# Patient Record
Sex: MALE | Race: WHITE | Employment: FULL TIME | URBAN - METROPOLITAN AREA
[De-identification: names, ages, dates, MRNs, and addresses within clinical notes are randomized per-mention and may not be internally consistent; named-entity substitution may affect disease eponyms.]

---

## 2021-06-17 ENCOUNTER — OFFICE VISIT (OUTPATIENT)
Dept: OCCUPATIONAL MEDICINE | Age: 41
End: 2021-06-17
Attending: FAMILY MEDICINE
Payer: COMMERCIAL

## 2021-06-17 DIAGNOSIS — Z00.00 ROUTINE GENERAL MEDICAL EXAMINATION AT A HEALTH CARE FACILITY: Primary | ICD-10-CM

## 2021-06-21 ENCOUNTER — LAB ENCOUNTER (OUTPATIENT)
Dept: LAB | Age: 41
End: 2021-06-21
Attending: FAMILY MEDICINE
Payer: COMMERCIAL

## 2021-06-21 DIAGNOSIS — Z00.00 ROUTINE GENERAL MEDICAL EXAMINATION AT A HEALTH CARE FACILITY: ICD-10-CM

## 2021-06-24 ENCOUNTER — APPOINTMENT (OUTPATIENT)
Dept: OCCUPATIONAL MEDICINE | Age: 41
End: 2021-06-24
Attending: FAMILY MEDICINE
Payer: COMMERCIAL

## 2021-07-13 ENCOUNTER — PATIENT MESSAGE (OUTPATIENT)
Dept: FAMILY MEDICINE CLINIC | Facility: CLINIC | Age: 41
End: 2021-07-13

## 2021-07-13 ENCOUNTER — OFFICE VISIT (OUTPATIENT)
Dept: FAMILY MEDICINE CLINIC | Facility: CLINIC | Age: 41
End: 2021-07-13
Payer: COMMERCIAL

## 2021-07-13 VITALS
SYSTOLIC BLOOD PRESSURE: 142 MMHG | HEIGHT: 70 IN | WEIGHT: 235 LBS | BODY MASS INDEX: 33.64 KG/M2 | HEART RATE: 69 BPM | DIASTOLIC BLOOD PRESSURE: 98 MMHG

## 2021-07-13 DIAGNOSIS — Z00.00 WELL ADULT EXAM: Primary | ICD-10-CM

## 2021-07-13 DIAGNOSIS — D22.9 MULTIPLE NEVI: ICD-10-CM

## 2021-07-13 DIAGNOSIS — Z72.0 TOBACCO USER: ICD-10-CM

## 2021-07-13 DIAGNOSIS — I10 ESSENTIAL HYPERTENSION: ICD-10-CM

## 2021-07-13 DIAGNOSIS — F41.9 ANXIETY: ICD-10-CM

## 2021-07-13 DIAGNOSIS — D17.9 MULTIPLE LIPOMAS: ICD-10-CM

## 2021-07-13 DIAGNOSIS — R22.1 NECK MASS: ICD-10-CM

## 2021-07-13 DIAGNOSIS — E78.5 HYPERLIPIDEMIA, UNSPECIFIED HYPERLIPIDEMIA TYPE: ICD-10-CM

## 2021-07-13 PROCEDURE — 99386 PREV VISIT NEW AGE 40-64: CPT | Performed by: NURSE PRACTITIONER

## 2021-07-13 PROCEDURE — 99203 OFFICE O/P NEW LOW 30 MIN: CPT | Performed by: NURSE PRACTITIONER

## 2021-07-13 RX ORDER — ROSUVASTATIN CALCIUM 5 MG/1
5 TABLET, COATED ORAL NIGHTLY
Qty: 14 TABLET | Refills: 0 | Status: SHIPPED | OUTPATIENT
Start: 2021-07-13 | End: 2021-07-29

## 2021-07-13 RX ORDER — EZETIMIBE 10 MG/1
10 TABLET ORAL NIGHTLY
COMMUNITY
End: 2021-07-13

## 2021-07-13 RX ORDER — FLUOXETINE 10 MG/1
10 TABLET, FILM COATED ORAL DAILY
Qty: 90 TABLET | Refills: 1 | Status: SHIPPED | OUTPATIENT
Start: 2021-07-13 | End: 2021-10-19

## 2021-07-13 RX ORDER — CLONAZEPAM 0.5 MG/1
0.5 TABLET ORAL 2 TIMES DAILY PRN
Qty: 30 TABLET | Refills: 0 | Status: SHIPPED | OUTPATIENT
Start: 2021-07-13

## 2021-07-13 RX ORDER — TELMISARTAN AND HYDROCHLORTHIAZIDE 80; 25 MG/1; MG/1
1 TABLET ORAL DAILY
Qty: 30 TABLET | Refills: 1 | Status: SHIPPED | OUTPATIENT
Start: 2021-07-13 | End: 2021-07-29

## 2021-07-13 RX ORDER — FLUOXETINE 20 MG/1
20 TABLET, FILM COATED ORAL DAILY
Qty: 90 TABLET | Refills: 1 | Status: SHIPPED | OUTPATIENT
Start: 2021-07-13 | End: 2021-10-19

## 2021-07-13 RX ORDER — EZETIMIBE 10 MG/1
10 TABLET ORAL NIGHTLY
Qty: 90 TABLET | Refills: 1 | Status: SHIPPED | OUTPATIENT
Start: 2021-07-13 | End: 2021-07-29

## 2021-07-13 RX ORDER — VALSARTAN AND HYDROCHLOROTHIAZIDE 80; 12.5 MG/1; MG/1
1 TABLET, FILM COATED ORAL DAILY
COMMUNITY
End: 2021-07-13 | Stop reason: DRUGHIGH

## 2021-07-13 RX ORDER — PAROXETINE 30 MG/1
30 TABLET, FILM COATED ORAL EVERY MORNING
Qty: 90 TABLET | Refills: 1 | Status: SHIPPED | OUTPATIENT
Start: 2021-07-13 | End: 2021-07-13 | Stop reason: CLARIF

## 2021-07-13 NOTE — PROGRESS NOTES
HPI  Pt here to establish care. Lived in [facility] for 8 years. Worked as a teacher. Has just recently returned [facility] and will need to [facility] care for refills.      klonipin-restless leg and sleep issues-0.25 mg-0.5 mg    Xanax for high anxiety 0.25 Vaccine: Birth to 56yrs Aged Out    Past Medical History:   Diagnosis Date   • Essential hypertension        .   Past Surgical History:   Procedure Laterality Date   • Other surgical history Left     Ankle francture        Family History   Problem Relation Tab Take 1 tablet (0.5 mg total) by mouth 2 (two) times daily as needed. 30 tablet 0   • ezetimibe 10 MG Oral Tab Take 1 tablet (10 mg total) by mouth nightly. 90 tablet 1   • Rosuvastatin Calcium 5 MG Oral Tab Take 1 tablet (5 mg total) by mouth nightly. jaw.; scattered nevi on arms, neck and face   Neurological:      Mental Status: He is alert and oriented to person, place, and time.       Coordination: Coordination normal.      Gait: Gait normal.   Psychiatric:         Behavior: Behavior normal.         T LIPID PANEL    TSH W REFLEX TO FREE T4    VITAMIN B12    VITAMIN D, 25-HYDROXY      Other Visit Diagnoses     Anxiety        Relevant Medications    clonazePAM 0.5 MG Oral Tab                  Discussed plan of care with pt and pt is in agreement. All qu

## 2021-07-13 NOTE — TELEPHONE ENCOUNTER
From: Melva Field  To: GARY Cain Do  Sent: 7/13/2021 5:06 PM CDT  Subject: Prescription Question    Godfrey Castellanos Reveal,     I went and picked up my prescriptions at [facility] and they didn't give me the prozac. I don't know if it was sent or not.  I for

## 2021-07-14 ENCOUNTER — TELEPHONE (OUTPATIENT)
Dept: FAMILY MEDICINE CLINIC | Facility: CLINIC | Age: 41
End: 2021-07-14

## 2021-07-14 NOTE — TELEPHONE ENCOUNTER
Current Outpatient Medications:   •  FLUoxetine HCl 20 MG Oral Tab, Take 1 tablet (20 mg total) by mouth daily. , Disp: 90 tablet, Rfl: 1    •  FLUoxetine HCl 10 MG Oral Tab, Take 1 tablet (10 mg total) by mouth daily. , Disp: 90 tablet, Rfl: 1    Message:

## 2021-07-14 NOTE — TELEPHONE ENCOUNTER
From: GARY Crooks  To: Sarah Gutierrez  Sent: 7/13/2021 7:26 PM CDT  Subject: meds    Leonard-I am so sorry that I missed sending in the fluoxetine.  In the [address] we do not have the 30 mg dose so I sent in a 20 mg and 10 mg tablet for you to take yokasta

## 2021-07-14 NOTE — TELEPHONE ENCOUNTER
Spoke to Pharmacy to inform medications are correct. Insurance only pays for a 30 day supply, approved to dispense medication for a 30 day supply at a time. See [employer] message 07/13/2021.

## 2021-07-15 NOTE — ASSESSMENT & PLAN NOTE
Encourage pt to quit smoking  Change to telmisartan hydrochlorothiazide 80/25 mg I po q day  Check bp daily and record  Send in results in 2 weeks

## 2021-07-16 LAB
ALBUMIN/GLOBULIN RATIO: 2.4 (CALC) (ref 1–2.5)
ALBUMIN: 4.8 G/DL (ref 3.6–5.1)
ALKALINE PHOSPHATASE: 61 U/L (ref 36–130)
ALT: 27 U/L (ref 9–46)
AST: 19 U/L (ref 10–40)
BILIRUBIN, TOTAL: 0.5 MG/DL (ref 0.2–1.2)
BUN: 16 MG/DL (ref 7–25)
CALCIUM: 9.8 MG/DL (ref 8.6–10.3)
CARBON DIOXIDE: 26 MMOL/L (ref 20–32)
CHLORIDE: 102 MMOL/L (ref 98–110)
CHOL/HDLC RATIO: 3.4 (CALC)
CHOLESTEROL, TOTAL: 155 MG/DL
CREATININE: 0.81 MG/DL (ref 0.6–1.35)
EGFR IF AFRICN AM: 128 ML/MIN/1.73M2
EGFR IF NONAFRICN AM: 110 ML/MIN/1.73M2
GLOBULIN: 2 G/DL (CALC) (ref 1.9–3.7)
GLUCOSE: 74 MG/DL (ref 65–99)
HDL CHOLESTEROL: 45 MG/DL
HEMATOCRIT: 40.7 % (ref 38.5–50)
HEMOGLOBIN A1C: 5.5 % OF TOTAL HGB
HEMOGLOBIN: 13.8 G/DL (ref 13.2–17.1)
LDL-CHOLESTEROL: 93 MG/DL (CALC)
MCH: 30.1 PG (ref 27–33)
MCHC: 33.9 G/DL (ref 32–36)
MCV: 88.9 FL (ref 80–100)
MPV: 10.5 FL (ref 7.5–12.5)
NON-HDL CHOLESTEROL: 110 MG/DL (CALC)
PLATELET COUNT: 290 THOUSAND/UL (ref 140–400)
POTASSIUM: 3.9 MMOL/L (ref 3.5–5.3)
PROTEIN, TOTAL: 6.8 G/DL (ref 6.1–8.1)
RDW: 13.2 % (ref 11–15)
RED BLOOD CELL COUNT: 4.58 MILLION/UL (ref 4.2–5.8)
SODIUM: 137 MMOL/L (ref 135–146)
TRIGLYCERIDES: 78 MG/DL
TSH W/REFLEX TO FT4: 2.37 MIU/L (ref 0.4–4.5)
VITAMIN B12: 443 PG/ML (ref 200–1100)
VITAMIN D, 25-OH, TOTAL: 33 NG/ML (ref 30–100)
WHITE BLOOD CELL COUNT: 9.6 THOUSAND/UL (ref 3.8–10.8)

## 2021-07-29 DIAGNOSIS — I10 ESSENTIAL HYPERTENSION: ICD-10-CM

## 2021-07-29 DIAGNOSIS — E78.5 HYPERLIPIDEMIA, UNSPECIFIED HYPERLIPIDEMIA TYPE: ICD-10-CM

## 2021-07-30 RX ORDER — EZETIMIBE 10 MG/1
10 TABLET ORAL NIGHTLY
Qty: 90 TABLET | Refills: 1 | Status: SHIPPED | OUTPATIENT
Start: 2021-07-30 | End: 2021-10-28

## 2021-07-30 RX ORDER — ROSUVASTATIN CALCIUM 5 MG/1
5 TABLET, COATED ORAL NIGHTLY
Qty: 90 TABLET | Refills: 1 | Status: SHIPPED | OUTPATIENT
Start: 2021-07-30 | End: 2022-06-20

## 2021-07-30 RX ORDER — TELMISARTAN AND HYDROCHLORTHIAZIDE 80; 25 MG/1; MG/1
1 TABLET ORAL DAILY
Qty: 90 TABLET | Refills: 1 | Status: SHIPPED | OUTPATIENT
Start: 2021-07-30 | End: 2021-10-19

## 2021-08-23 ENCOUNTER — OFFICE VISIT (OUTPATIENT)
Dept: FAMILY MEDICINE CLINIC | Facility: CLINIC | Age: 41
End: 2021-08-23
Payer: COMMERCIAL

## 2021-08-23 VITALS
HEART RATE: 74 BPM | SYSTOLIC BLOOD PRESSURE: 132 MMHG | HEIGHT: 70 IN | BODY MASS INDEX: 33.07 KG/M2 | DIASTOLIC BLOOD PRESSURE: 90 MMHG | WEIGHT: 231 LBS

## 2021-08-23 DIAGNOSIS — J01.00 ACUTE NON-RECURRENT MAXILLARY SINUSITIS: Primary | ICD-10-CM

## 2021-08-23 PROCEDURE — 99213 OFFICE O/P EST LOW 20 MIN: CPT | Performed by: NURSE PRACTITIONER

## 2021-08-23 PROCEDURE — 3080F DIAST BP >= 90 MM HG: CPT | Performed by: NURSE PRACTITIONER

## 2021-08-23 PROCEDURE — 3075F SYST BP GE 130 - 139MM HG: CPT | Performed by: NURSE PRACTITIONER

## 2021-08-23 PROCEDURE — 3008F BODY MASS INDEX DOCD: CPT | Performed by: NURSE PRACTITIONER

## 2021-08-23 RX ORDER — ALBUTEROL SULFATE 90 UG/1
2 AEROSOL, METERED RESPIRATORY (INHALATION) EVERY 4 HOURS PRN
Qty: 18 G | Refills: 5 | Status: SHIPPED | OUTPATIENT
Start: 2021-08-23

## 2021-08-23 RX ORDER — LEVOFLOXACIN 500 MG/1
500 TABLET, FILM COATED ORAL DAILY
Qty: 10 TABLET | Refills: 0 | Status: SHIPPED | OUTPATIENT
Start: 2021-08-23 | End: 2021-09-02

## 2021-08-23 NOTE — PROGRESS NOTES
HPI  Pt here for uri for the past 3 weeks. Covid tested a couple of time and all were negative. Took robitussin and muccinex. Took also sudafed and ibuprofen. Symptoms not really improved. Feels very congested, has occasional cough.  Denies fever, sor Other Topics      Concerns:        Not on file    Social History Narrative      Not on file    Social Determinants of Health  Financial Resource Strain:       Difficulty of Paying Living Expenses:   Food Insecurity:       Worried About Running Out of Food • FLUoxetine HCl 10 MG Oral Tab Take 1 tablet (10 mg total) by mouth daily. 90 tablet 1       Allergies:    Prednisone              ANXIETY    Physical Exam  Vitals and nursing note reviewed. Constitutional:       General: He is not in acute distress. MCG/ACT Inhalation Aero Soln    Spacer/Aero-Holding Chambers Does not apply Device                  Discussed plan of care with pt and pt is in agreement. All questions answered. Pt to call with questions or concerns.       Encouraged to sign up for My Chart

## 2021-08-27 ENCOUNTER — HOSPITAL ENCOUNTER (OUTPATIENT)
Dept: ULTRASOUND IMAGING | Age: 41
Discharge: HOME | End: 2021-08-27
Attending: NURSE PRACTITIONER
Payer: COMMERCIAL

## 2021-08-27 DIAGNOSIS — R22.1 NECK MASS: ICD-10-CM

## 2021-08-27 PROCEDURE — 76536 US EXAM OF HEAD AND NECK: CPT | Performed by: NURSE PRACTITIONER

## 2021-09-30 ENCOUNTER — TELEPHONE (OUTPATIENT)
Dept: FAMILY MEDICINE CLINIC | Facility: CLINIC | Age: 41
End: 2021-09-30

## 2021-09-30 DIAGNOSIS — J01.00 ACUTE NON-RECURRENT MAXILLARY SINUSITIS: ICD-10-CM

## 2021-09-30 RX ORDER — LEVOFLOXACIN 500 MG/1
TABLET, FILM COATED ORAL
Qty: 10 TABLET | Refills: 0 | OUTPATIENT
Start: 2021-09-30

## 2021-09-30 NOTE — TELEPHONE ENCOUNTER
Spoke to patient, he received a call from [facility] regarding a refill on the Levaquin. He has no idea why they would call him. He does not need a refill on it.  He informed that his insurance advised him he will need to use [facility] or a mail order so he i

## 2021-10-19 ENCOUNTER — PATIENT MESSAGE (OUTPATIENT)
Dept: FAMILY MEDICINE CLINIC | Facility: CLINIC | Age: 41
End: 2021-10-19

## 2021-10-19 ENCOUNTER — MED REC SCAN ONLY (OUTPATIENT)
Dept: FAMILY MEDICINE CLINIC | Facility: CLINIC | Age: 41
End: 2021-10-19

## 2021-10-19 DIAGNOSIS — I10 ESSENTIAL HYPERTENSION: ICD-10-CM

## 2021-10-19 RX ORDER — FLUOXETINE 20 MG/1
20 TABLET, FILM COATED ORAL DAILY
Qty: 90 TABLET | Refills: 0 | Status: SHIPPED | OUTPATIENT
Start: 2021-10-19 | End: 2021-12-30

## 2021-10-19 RX ORDER — FLUOXETINE 10 MG/1
10 TABLET, FILM COATED ORAL DAILY
Qty: 90 TABLET | Refills: 0 | Status: SHIPPED | OUTPATIENT
Start: 2021-10-19 | End: 2021-12-30

## 2021-10-19 RX ORDER — TELMISARTAN AND HYDROCHLORTHIAZIDE 80; 25 MG/1; MG/1
1 TABLET ORAL DAILY
Qty: 90 TABLET | Refills: 0 | Status: SHIPPED | OUTPATIENT
Start: 2021-10-19 | End: 2021-12-20

## 2021-10-19 NOTE — TELEPHONE ENCOUNTER
From: Melva Field  To: GARY Cain Do  Sent: 10/19/2021 9:59 AM CDT  Subject: [facility] 90 day supply     Godfrey Castellanos Reveal,   My insurance says it’s been trying to contact you so I can get 90 day supplies of my Fluoxetine and Telmisartan.  If you can he

## 2021-10-21 ENCOUNTER — TELEPHONE (OUTPATIENT)
Dept: FAMILY MEDICINE CLINIC | Facility: CLINIC | Age: 41
End: 2021-10-21

## 2021-10-21 NOTE — TELEPHONE ENCOUNTER
Pharmacy comments:     The pt was prescribed FLUoxetine TAB 10MG and FLUoxetine TAB 20MG, please verify which dose the pt should be on

## 2021-10-21 NOTE — TELEPHONE ENCOUNTER
Patient is on Fluoxetine 30 mg. Medication does not come in this dose, Kay Canales prescribed 2 0mg and 10 mg to take together. Called [facility], spoke with Noel Carney and notified.      Encounter Date:  7/13/2021                 Signed        From: Sherri Siemens,

## 2021-12-20 DIAGNOSIS — I10 ESSENTIAL HYPERTENSION: ICD-10-CM

## 2021-12-20 RX ORDER — TELMISARTAN AND HYDROCHLORTHIAZIDE 80; 25 MG/1; MG/1
1 TABLET ORAL DAILY
Qty: 90 TABLET | Refills: 1 | Status: SHIPPED | OUTPATIENT
Start: 2021-12-20 | End: 2022-05-16

## 2021-12-20 NOTE — TELEPHONE ENCOUNTER
Refill passed per [facility] protocol. Requested Prescriptions   Pending Prescriptions Disp Refills    TELMISARTAN-HCTZ 80-25 MG Oral Tab [Pharmacy Med Name: Murleen Malone  TAB  80 25] 90 tablet 3     Sig: Take 1 tablet by mouth daily.         Hypertensive Medications Protocol Passed - 12/20/2021  4:57 AM        Passed - CMP or BMP in past 12 months        Passed - Appointment in past 6 or next 3 months        Passed - GFR Non- > 50     Lab Results   Component Value Date    GFRNAA 110 07/15/2021                        Recent Outpatient Visits              3 months ago Acute non-recurrent maxillary sinusitis    [facility] APRN    Office Visit    5 months ago Well adult exam    [place] [facility] [place], [address], [address] APRN    Office Visit    6 months ago Routine general medical examination at a health care facility    [employer] in [address] [place] [address]

## 2021-12-21 PROBLEM — F33.1 MODERATE EPISODE OF RECURRENT MAJOR DEPRESSIVE DISORDER (HCC): Status: ACTIVE | Noted: 2021-12-21

## 2021-12-21 PROBLEM — F10.10 ALCOHOL USE DISORDER, MILD, ABUSE: Status: ACTIVE | Noted: 2021-12-21

## 2021-12-21 PROBLEM — J01.00 ACUTE NON-RECURRENT MAXILLARY SINUSITIS: Status: RESOLVED | Noted: 2021-08-23 | Resolved: 2021-12-21

## 2021-12-21 NOTE — ASSESSMENT & PLAN NOTE
Is looking to join [facility] for support  Will be addressing depression w therapist and psychiatrist  Does not think he needs any medications at this point to continue sobriety

## 2021-12-21 NOTE — ASSESSMENT & PLAN NOTE
Will need extension on fmla  Continue prozac  Will be seeing therapist and psychiatrist after the holidays  Please call if symptoms worsen or are not resolving.

## 2021-12-21 NOTE — PROGRESS NOTES
HPI  Video Call  Pt presents for follow up-pt admitted self to detox for alcohol abuse. Switched prozac to Trintellix-weaning was too quick and pt had bad reaction. Is back on therapist.   Was recommended to do out pt PHP program by care manager.    Needs 2 tobacco: Current User    Substance and Sexual Activity      Alcohol use: Not Currently        Comment: occ      Drug use: Yes        Types: Cannabis      Sexual activity: Not on file    Other Topics      Concerns:        Not on file    Social History [facility]:         Mood and Affect: Mood is depressed.          Assessment and Plan:   Problem List Items Addressed This Visit        Mental Health    Alcohol use disorder, mild, abuse - Primary     Is looking to join [facility] for support  Will be

## 2021-12-30 RX ORDER — FLUOXETINE 20 MG/1
TABLET, FILM COATED ORAL
Qty: 90 TABLET | Refills: 3 | Status: SHIPPED | OUTPATIENT
Start: 2021-12-30

## 2021-12-30 RX ORDER — FLUOXETINE 10 MG/1
TABLET, FILM COATED ORAL
Qty: 90 TABLET | Refills: 3 | Status: SHIPPED | OUTPATIENT
Start: 2021-12-30

## 2021-12-30 NOTE — TELEPHONE ENCOUNTER
From: Anthony Yepez  To: GARY Pedro  Sent: 12/30/2021 7:52 AM CST  Subject: Paperwork for Disability Claim    Hi Lisa Acosta,     I hope you had a nice [place].  I’m writing because I got a call from [address] [place] [address] (my disability provider) and they said t

## 2021-12-31 ENCOUNTER — PATIENT MESSAGE (OUTPATIENT)
Dept: FAMILY MEDICINE CLINIC | Facility: CLINIC | Age: 41
End: 2021-12-31

## 2021-12-31 ENCOUNTER — MED REC SCAN ONLY (OUTPATIENT)
Dept: ADMINISTRATIVE | Age: 41
End: 2021-12-31

## 2021-12-31 ENCOUNTER — TELEPHONE (OUTPATIENT)
Dept: ADMINISTRATIVE | Age: 41
End: 2021-12-31

## 2021-12-31 NOTE — TELEPHONE ENCOUNTER
FMLA/Disability forms received in forms department, logged for processing, sent pt a CDEL message for missing HIPAA.

## 2021-12-31 NOTE — TELEPHONE ENCOUNTER
From: Haroon Stinson  To: GARY Amaro  Sent: 12/31/2021 10:45 AM CST  Subject: Doctors Note    Jeni Ramsey,     I was wondering if I could get a doctors note to give to my employer about our conversation last week concerning my short term disabi

## 2021-12-31 NOTE — TELEPHONE ENCOUNTER
Patient called to see if we rec'd disability forms from [facility]. We have not, gave forms fax number and, explained process/delay. He needs disab starting 12/21  (saw [employer]) - few weeks.  He was off previously and I explained [employer] can only cover 12/21 and forw

## 2022-01-12 NOTE — TELEPHONE ENCOUNTER
From: David Garcia  To: GARY Be  Sent: 12/31/2021 10:45 AM CST  Subject: Doctors Note    Rebecca Ho,     I was wondering if I could get a doctors note to give to my employer about our conversation last week concerning my short term disabi

## 2022-01-12 NOTE — TELEPHONE ENCOUNTER
Returned pt's call from 1/11/2022, pt checking on status of forms, pt states [facility] is unable to provide another extension, will try to expedite forms for pt.  First day off of work was 12/21/2021, per previous letter RTW 3 weeks but pt will contact [employer]

## 2022-01-14 NOTE — TELEPHONE ENCOUNTER
Dear Reyes Myers,    *2 forms requiring signature please*      Please sign off on form: FMLA/Disability  -Highlight the patient and hit \"Chart\" button.   -In Chart Review, w/in the Encounter tab - click 1 time on the Telephone call encounter for 12/31

## 2022-01-14 NOTE — TELEPHONE ENCOUNTER
Federico Coffman    I am working on your patient's form today. I apologize for the delay. We are currently extremely back up in the forms department. I can have his return to work date be 1/24/2022 since you approve.     Thank you,    Champ Ramos

## 2022-01-17 NOTE — TELEPHONE ENCOUNTER
Completed FMLA and disability forms faxed to [facility] at [phone], confirmation received. Sent pt a [employer] message.

## 2022-05-09 ENCOUNTER — OFFICE VISIT (OUTPATIENT)
Dept: FAMILY MEDICINE CLINIC | Facility: CLINIC | Age: 42
End: 2022-05-09
Payer: COMMERCIAL

## 2022-05-09 VITALS
BODY MASS INDEX: 34.5 KG/M2 | DIASTOLIC BLOOD PRESSURE: 92 MMHG | HEIGHT: 70 IN | HEART RATE: 92 BPM | SYSTOLIC BLOOD PRESSURE: 134 MMHG | WEIGHT: 241 LBS
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
## 2022-05-09 DIAGNOSIS — E78.5 HYPERLIPIDEMIA, UNSPECIFIED HYPERLIPIDEMIA TYPE: ICD-10-CM

## 2022-05-09 DIAGNOSIS — S00.86XA BUG BITE OF FACE WITH INFECTION, INITIAL ENCOUNTER: ICD-10-CM

## 2022-05-09 DIAGNOSIS — L08.9 BUG BITE OF FACE WITH INFECTION, INITIAL ENCOUNTER: ICD-10-CM

## 2022-05-09 DIAGNOSIS — I10 ESSENTIAL HYPERTENSION: Primary | ICD-10-CM

## 2022-05-09 DIAGNOSIS — F41.9 ANXIETY: ICD-10-CM

## 2022-05-09 PROCEDURE — 3075F SYST BP GE 130 - 139MM HG: CPT | Performed by: NURSE PRACTITIONER

## 2022-05-09 PROCEDURE — 3080F DIAST BP >= 90 MM HG: CPT | Performed by: NURSE PRACTITIONER

## 2022-05-09 PROCEDURE — 3008F BODY MASS INDEX DOCD: CPT | Performed by: NURSE PRACTITIONER

## 2022-05-09 PROCEDURE — 99214 OFFICE O/P EST MOD 30 MIN: CPT | Performed by: NURSE PRACTITIONER

## 2022-05-09 RX ORDER — CLONAZEPAM 0.5 MG/1
0.5 TABLET ORAL 2 TIMES DAILY PRN
Qty: 30 TABLET | Refills: 0 | Status: SHIPPED | OUTPATIENT
Start: 2022-05-09

## 2022-05-09 RX ORDER — SULFAMETHOXAZOLE AND TRIMETHOPRIM 800; 160 MG/1; MG/1
1 TABLET ORAL 2 TIMES DAILY
Qty: 14 TABLET | Refills: 0 | Status: SHIPPED | OUTPATIENT
Start: 2022-05-09 | End: 2022-05-16

## 2022-05-16 RX ORDER — TELMISARTAN AND HYDROCHLORTHIAZIDE 80; 25 MG/1; MG/1
1 TABLET ORAL DAILY
Qty: 90 TABLET | Refills: 1 | Status: SHIPPED | OUTPATIENT
Start: 2022-05-16

## 2022-05-16 NOTE — TELEPHONE ENCOUNTER
Refill passed per [facility] protocol. Requested Prescriptions   Pending Prescriptions Disp Refills    TELMISARTAN-HCTZ 80-25 MG Oral Tab [Pharmacy Med Name: Fe Bates  TAB  80 25] 90 tablet 3     Sig: Take 1 tablet by mouth daily.         Hypertensive Medications Protocol Passed - 5/16/2022  4:43 AM        Passed - CMP or BMP in past 12 months        Passed - Appointment in past 6 or next 3 months        Passed - GFR Non- > 50     Lab Results   Component Value Date    GFRNAA 110 07/15/2021                     Recent Outpatient Visits              1 week ago Essential hypertension    [facility], [address], [address]N    Office Visit    3 months ago Adjustment disorder with mixed anxiety and depressed mood    [address] [place] [address]    Telemedicine    3 months ago Adjustment disorder with mixed anxiety and depressed mood    [facility]    Telemedicine    4 months ago Adjustment disorder with mixed anxiety and depressed mood    [facility]    Telemedicine    4 months ago Alcohol use disorder, mild, abuse    [address], APRN    Telemedicine

## 2022-06-01 ENCOUNTER — OFFICE VISIT (OUTPATIENT)
Dept: FAMILY MEDICINE CLINIC | Facility: CLINIC | Age: 42
End: 2022-06-01
Payer: COMMERCIAL

## 2022-06-01 VITALS
WEIGHT: 239 LBS | SYSTOLIC BLOOD PRESSURE: 164 MMHG | DIASTOLIC BLOOD PRESSURE: 107 MMHG | HEART RATE: 114 BPM | BODY MASS INDEX: 34.22 KG/M2 | HEIGHT: 70 IN

## 2022-06-01 DIAGNOSIS — I10 ESSENTIAL HYPERTENSION: Primary | ICD-10-CM

## 2022-06-01 PROCEDURE — 3077F SYST BP >= 140 MM HG: CPT | Performed by: NURSE PRACTITIONER

## 2022-06-01 PROCEDURE — 99214 OFFICE O/P EST MOD 30 MIN: CPT | Performed by: NURSE PRACTITIONER

## 2022-06-01 PROCEDURE — 3080F DIAST BP >= 90 MM HG: CPT | Performed by: NURSE PRACTITIONER

## 2022-06-01 PROCEDURE — 3008F BODY MASS INDEX DOCD: CPT | Performed by: NURSE PRACTITIONER

## 2022-06-01 RX ORDER — AMLODIPINE BESYLATE 5 MG/1
5 TABLET ORAL DAILY
Qty: 90 TABLET | Refills: 0 | Status: SHIPPED | OUTPATIENT
Start: 2022-06-01 | End: 2023-05-27

## 2022-06-02 NOTE — ASSESSMENT & PLAN NOTE
Continue telmisartan hydrochlorothiazide  Add amlodipine 5 mg daily  Please aim to eat a diet high in fresh fruits and vegetables, lean protein sources, complex carbohydrates and limited processed and fast foods. Try to get at least 150 minutes of exercise per week-a combination of weight resistance and cardio is preferred.

## 2022-06-20 DIAGNOSIS — I10 ESSENTIAL HYPERTENSION: ICD-10-CM

## 2022-06-20 DIAGNOSIS — F41.9 ANXIETY: ICD-10-CM

## 2022-06-20 DIAGNOSIS — E78.5 HYPERLIPIDEMIA, UNSPECIFIED HYPERLIPIDEMIA TYPE: ICD-10-CM

## 2022-06-20 DIAGNOSIS — J01.00 ACUTE NON-RECURRENT MAXILLARY SINUSITIS: ICD-10-CM

## 2022-06-20 RX ORDER — TELMISARTAN AND HYDROCHLORTHIAZIDE 80; 25 MG/1; MG/1
1 TABLET ORAL DAILY
Qty: 90 TABLET | Refills: 3 | Status: SHIPPED | OUTPATIENT
Start: 2022-06-20

## 2022-06-20 RX ORDER — AMLODIPINE BESYLATE 5 MG/1
5 TABLET ORAL DAILY
Qty: 90 TABLET | Refills: 1 | Status: SHIPPED | OUTPATIENT
Start: 2022-06-20 | End: 2023-06-15

## 2022-06-20 RX ORDER — FLUOXETINE 10 MG/1
10 TABLET, FILM COATED ORAL DAILY
Qty: 90 TABLET | Refills: 3 | Status: SHIPPED | OUTPATIENT
Start: 2022-06-20

## 2022-06-20 RX ORDER — FLUOXETINE 20 MG/1
20 TABLET, FILM COATED ORAL DAILY
Qty: 90 TABLET | Refills: 3 | Status: SHIPPED | OUTPATIENT
Start: 2022-06-20

## 2022-06-20 RX ORDER — CLONAZEPAM 0.5 MG/1
0.5 TABLET ORAL 2 TIMES DAILY PRN
Qty: 90 TABLET | Refills: 0 | Status: SHIPPED | OUTPATIENT
Start: 2022-06-20

## 2022-06-20 RX ORDER — ROSUVASTATIN CALCIUM 5 MG/1
5 TABLET, COATED ORAL NIGHTLY
Qty: 90 TABLET | Refills: 3 | Status: SHIPPED | OUTPATIENT
Start: 2022-06-20

## 2022-06-20 RX ORDER — ALBUTEROL SULFATE 90 UG/1
2 AEROSOL, METERED RESPIRATORY (INHALATION) EVERY 4 HOURS PRN
Qty: 3 EACH | Refills: 3 | Status: SHIPPED | OUTPATIENT
Start: 2022-06-20

## 2022-06-22 ENCOUNTER — OFFICE VISIT (OUTPATIENT)
Dept: FAMILY MEDICINE CLINIC | Facility: CLINIC | Age: 42
End: 2022-06-22
Payer: COMMERCIAL

## 2022-06-22 VITALS
WEIGHT: 238 LBS | SYSTOLIC BLOOD PRESSURE: 122 MMHG | DIASTOLIC BLOOD PRESSURE: 84 MMHG | HEIGHT: 70 IN | HEART RATE: 93 BPM | BODY MASS INDEX: 34.07 KG/M2

## 2022-06-22 DIAGNOSIS — I10 ESSENTIAL HYPERTENSION: Primary | ICD-10-CM

## 2022-06-22 DIAGNOSIS — E66.9 OBESITY (BMI 30.0-34.9): ICD-10-CM

## 2022-06-22 DIAGNOSIS — Z72.0 TOBACCO USER: ICD-10-CM
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
## 2022-06-22 PROBLEM — S00.86XA BUG BITE OF FACE WITH INFECTION, INITIAL ENCOUNTER: Status: RESOLVED | Noted: 2022-05-09 | Resolved: 2022-06-22

## 2022-06-22 PROBLEM — L08.9 BUG BITE OF FACE WITH INFECTION, INITIAL ENCOUNTER: Status: RESOLVED | Noted: 2022-05-09 | Resolved: 2022-06-22

## 2022-06-22 PROCEDURE — 3008F BODY MASS INDEX DOCD: CPT | Performed by: NURSE PRACTITIONER

## 2022-06-22 PROCEDURE — 3074F SYST BP LT 130 MM HG: CPT | Performed by: NURSE PRACTITIONER

## 2022-06-22 PROCEDURE — 99214 OFFICE O/P EST MOD 30 MIN: CPT | Performed by: NURSE PRACTITIONER

## 2022-06-22 PROCEDURE — 3079F DIAST BP 80-89 MM HG: CPT | Performed by: NURSE PRACTITIONER

## 2022-06-22 RX ORDER — AMLODIPINE BESYLATE 10 MG/1
10 TABLET ORAL DAILY
Qty: 90 TABLET | Refills: 1 | Status: SHIPPED | OUTPATIENT
Start: 2022-06-22

## 2022-06-22 NOTE — ASSESSMENT & PLAN NOTE
Continue telmisartan hydrochlorothiazide  Increase amlodipine to 10 mg daily  Continue to check bp bid  Encourage to quit smoking and lose weight  Encourage follow up for bp when settled in [place].

## 2022-06-22 NOTE — ASSESSMENT & PLAN NOTE
Please aim to eat a diet high in fresh fruits and vegetables, lean protein sources, complex carbohydrates and limited processed and fast foods. Try to get at least 150 minutes of exercise per week-a combination of weight resistance and cardio is preferred.

## 2022-06-24 LAB
ALBUMIN/GLOBULIN RATIO: 2.1 (CALC) (ref 1–2.5)
ALBUMIN: 4.7 G/DL (ref 3.6–5.1)
ALKALINE PHOSPHATASE: 57 U/L (ref 36–130)
ALT: 32 U/L (ref 9–46)
AST: 18 U/L (ref 10–40)
BILIRUBIN, TOTAL: 0.5 MG/DL (ref 0.2–1.2)
BUN: 14 MG/DL (ref 7–25)
CALCIUM: 10 MG/DL (ref 8.6–10.3)
CARBON DIOXIDE: 26 MMOL/L (ref 20–32)
CHLORIDE: 100 MMOL/L (ref 98–110)
CHOL/HDLC RATIO: 5 (CALC)
CHOLESTEROL, TOTAL: 246 MG/DL
CREATININE: 1.03 MG/DL (ref 0.6–1.35)
EGFR IF AFRICN AM: 103 ML/MIN/1.73M2
EGFR IF NONAFRICN AM: 89 ML/MIN/1.73M2
GLOBULIN: 2.2 G/DL (CALC) (ref 1.9–3.7)
GLUCOSE: 97 MG/DL (ref 65–139)
HDL CHOLESTEROL: 49 MG/DL
LDL-CHOLESTEROL: 165 MG/DL (CALC)
NON-HDL CHOLESTEROL: 197 MG/DL (CALC)
POTASSIUM: 3.9 MMOL/L (ref 3.5–5.3)
PROTEIN, TOTAL: 6.9 G/DL (ref 6.1–8.1)
SODIUM: 137 MMOL/L (ref 135–146)
TRIGLYCERIDES: 175 MG/DL
TSH W/REFLEX TO FT4: 3.2 MIU/L (ref 0.4–4.5)

## 2022-06-25 RX ORDER — ROSUVASTATIN CALCIUM 10 MG/1
10 TABLET, COATED ORAL NIGHTLY
Qty: 90 TABLET | Refills: 1 | Status: SHIPPED | OUTPATIENT
Start: 2022-06-25

## (undated) DIAGNOSIS — I10 ESSENTIAL HYPERTENSION: ICD-10-CM

## (undated) NOTE — LETTER
08/12/21        [address] [address]      Dear Naresh Smyth,    Our records indicate that you have outstanding lab work and or testing that was ordered for you and has not yet been completed:  Orders Placed This Encounter

## (undated) NOTE — LETTER
12/31/2021          To Whom It May Concern:    Blanca Spurling is currently under my medical care and may not return to work at this time. Please excuse Alexa Ramirez for 3 weeks as he continues to undergo medical treatment.    His forms have been submitted to

## (undated) NOTE — LETTER
1/20/2022          To Whom It May Concern:    Catrachita Diaz is currently under my medical care and may not return to work at this time. He may return to work on 2/7/2022. Activity is restricted as follows: none.     If you require additional inform